# Patient Record
Sex: FEMALE | Race: WHITE | NOT HISPANIC OR LATINO | URBAN - METROPOLITAN AREA
[De-identification: names, ages, dates, MRNs, and addresses within clinical notes are randomized per-mention and may not be internally consistent; named-entity substitution may affect disease eponyms.]

---

## 2018-05-01 ENCOUNTER — OFFICE (OUTPATIENT)
Dept: URBAN - METROPOLITAN AREA CLINIC 12 | Facility: CLINIC | Age: 23
End: 2018-05-01

## 2018-05-01 VITALS
SYSTOLIC BLOOD PRESSURE: 119 MMHG | WEIGHT: 133 LBS | HEIGHT: 67 IN | HEART RATE: 75 BPM | DIASTOLIC BLOOD PRESSURE: 77 MMHG

## 2018-05-01 DIAGNOSIS — K59.00 CONSTIPATION, UNSPECIFIED: ICD-10-CM

## 2018-05-01 DIAGNOSIS — K60.2 ANAL FISSURE, UNSPECIFIED: ICD-10-CM

## 2018-05-01 DIAGNOSIS — K62.89 OTHER SPECIFIED DISEASES OF ANUS AND RECTUM: ICD-10-CM

## 2018-05-01 DIAGNOSIS — K64.9 UNSPECIFIED HEMORRHOIDS: ICD-10-CM

## 2018-05-01 DIAGNOSIS — K62.5 HEMORRHAGE OF ANUS AND RECTUM: ICD-10-CM

## 2018-05-01 PROCEDURE — 99203 OFFICE O/P NEW LOW 30 MIN: CPT | Performed by: INTERNAL MEDICINE

## 2018-05-01 NOTE — SERVICEHPINOTES
Ms. Newton is a 22-year-old female here for evaluation of hemorrhoids and rectal bleeding. The patient states that she has had issues with hemorrhoids and rectal protrusions intermittently since high school. She states that they will flareup every 1-2 months at which time she will take some over-the-counter preparation H cream. More recently she has been having intermittent issues with rectal bleeding. She states that over the past 1-2 years about once a week she will note a small amount of red blood whenever she wipes after having a bowel movement. She did note a few weeks ago that she had several episodes of drips of a small amount of blood after bowel movement into the toilet water to make it red but no large volume hematochezia or blood clots. She denies any black bowel movements. There is no first-degree family history of colon cancer colon polyps. She states normally she will have regular bowel movements about once a month she will have some harder bowel movements with some occasional straining. She states that her father has hemorrhoids. She denies any issues with anemia. She has never had any endoscopic evaluation. There has been no fevers, chills, weight loss, or abdominal pain.

## 2018-05-01 NOTE — SERVICENOTES
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  She does have some small external hemorrhoids and so could very well have some internal hemorrhoids as well.  The differential does include colitis, polyp, and neoplasm but this is less likely.  With that said, given her longstanding intermittent bleeding over the past 1-2 years I do think it is reasonable that she undergo at least flexible sigmoidoscopy to rule out any distal neoplasm.  Given her young age flexible sigmoidoscopy should be adequate, however the patient states that she would discuss full colonoscopy with her parents.  She was told to notify us if she would like to proceed with the flex sig and we can schedule this in clinic.  In the meantime I will recommend that she try nifedipine cream because of her apparent anal fissure and let us know whether not this works.  Also recommended she do a Sitz bath every night.  It is also very important that she works on the intermittent constipation that she has and is sure to drink plenty of water and use stool softeners.

## 2024-01-29 ENCOUNTER — OFFICE (OUTPATIENT)
Dept: URBAN - METROPOLITAN AREA CLINIC 12 | Facility: CLINIC | Age: 29
End: 2024-01-29

## 2024-01-29 VITALS
HEIGHT: 67 IN | SYSTOLIC BLOOD PRESSURE: 119 MMHG | WEIGHT: 133 LBS | DIASTOLIC BLOOD PRESSURE: 75 MMHG | HEART RATE: 70 BPM | OXYGEN SATURATION: 97 %

## 2024-01-29 DIAGNOSIS — K62.5 HEMORRHAGE OF ANUS AND RECTUM: ICD-10-CM

## 2024-01-29 DIAGNOSIS — K64.9 UNSPECIFIED HEMORRHOIDS: ICD-10-CM

## 2024-01-29 DIAGNOSIS — K59.00 CONSTIPATION, UNSPECIFIED: ICD-10-CM

## 2024-01-29 PROCEDURE — 99204 OFFICE O/P NEW MOD 45 MIN: CPT | Performed by: NURSE PRACTITIONER

## 2024-01-29 RX ORDER — HYDROCORTISONE ACETATE 25 MG/1
50 SUPPOSITORY RECTAL
Qty: 14 | Refills: 1 | Status: ACTIVE
Start: 2024-01-29

## 2024-01-29 RX ORDER — SODIUM PICOSULFATE, MAGNESIUM OXIDE, AND ANHYDROUS CITRIC ACID 10; 3.5; 12 MG/160ML; G/160ML; G/160ML
LIQUID ORAL
Qty: 1 | Refills: 0 | Status: COMPLETED
Start: 2024-01-29 | End: 2024-03-02

## 2024-01-29 NOTE — SERVICEHPINOTES
Ms. Newton is a 28 year old   female who is here today in referral for PCP for complaints of constipation, anal fissure, hemorrhoids and blood in her stool.
ifeoma dia  She reports that she has had intermittent blood in her stool for last several years and was previously evaluated by Dr. Lerner with recommendations of sigmoidoscopy.  She was in med school at the time and due to her busy schedule was unable to complete this.  She currently has 2-4 bowel movements a week.  She has had constipation for several years, but feels that it has worsened recently.  She will occasionally take MiraLax to help her have a bowel movement. She is experiencing bloating. She sees blood in her stool approximately once a week.  She reports that it is worse when she is more constipated. She denies rectal pain. She denies symptoms of nausea, vomiting or abdominal pain.  She will occasionally have symptoms of reflux, but reports it is infrequent.  
ifeoma Hayden denies a family history of colon cancer or other GI malignancies.  Her mother has a history of Callaway's.

## 2024-01-31 LAB
TTG IGA/DGP IGG COMBO SCREEN: DEAMIDATED GLIADIN ABS, IGG: 3 UNITS (ref 0–19)
TTG IGA/DGP IGG COMBO SCREEN: T-TRANSGLUTAMINASE (TTG) IGA: <2 U/ML

## 2024-03-02 ENCOUNTER — AMBULATORY SURGICAL CENTER (OUTPATIENT)
Dept: URBAN - METROPOLITAN AREA SURGERY 2 | Facility: SURGERY | Age: 29
End: 2024-03-02

## 2024-03-02 VITALS
DIASTOLIC BLOOD PRESSURE: 74 MMHG | DIASTOLIC BLOOD PRESSURE: 74 MMHG | TEMPERATURE: 98.3 F | OXYGEN SATURATION: 100 % | HEART RATE: 69 BPM | DIASTOLIC BLOOD PRESSURE: 58 MMHG | RESPIRATION RATE: 18 BRPM | SYSTOLIC BLOOD PRESSURE: 100 MMHG | TEMPERATURE: 99.7 F | SYSTOLIC BLOOD PRESSURE: 96 MMHG | RESPIRATION RATE: 16 BRPM | HEART RATE: 66 BPM | DIASTOLIC BLOOD PRESSURE: 58 MMHG | SYSTOLIC BLOOD PRESSURE: 112 MMHG | OXYGEN SATURATION: 97 % | HEART RATE: 66 BPM | TEMPERATURE: 98.3 F | SYSTOLIC BLOOD PRESSURE: 117 MMHG | HEART RATE: 66 BPM | HEART RATE: 68 BPM | DIASTOLIC BLOOD PRESSURE: 62 MMHG | HEART RATE: 63 BPM | RESPIRATION RATE: 17 BRPM | SYSTOLIC BLOOD PRESSURE: 112 MMHG | TEMPERATURE: 98.3 F | OXYGEN SATURATION: 100 % | SYSTOLIC BLOOD PRESSURE: 112 MMHG | OXYGEN SATURATION: 98 % | HEART RATE: 63 BPM | TEMPERATURE: 99.7 F | RESPIRATION RATE: 16 BRPM | HEIGHT: 67 IN | OXYGEN SATURATION: 98 % | HEART RATE: 63 BPM | RESPIRATION RATE: 17 BRPM | SYSTOLIC BLOOD PRESSURE: 117 MMHG | DIASTOLIC BLOOD PRESSURE: 62 MMHG | HEIGHT: 67 IN | RESPIRATION RATE: 16 BRPM | OXYGEN SATURATION: 99 % | OXYGEN SATURATION: 100 % | RESPIRATION RATE: 20 BRPM | HEART RATE: 68 BPM | WEIGHT: 121 LBS | DIASTOLIC BLOOD PRESSURE: 58 MMHG | RESPIRATION RATE: 18 BRPM | HEART RATE: 64 BPM | OXYGEN SATURATION: 97 % | SYSTOLIC BLOOD PRESSURE: 96 MMHG | RESPIRATION RATE: 18 BRPM | SYSTOLIC BLOOD PRESSURE: 117 MMHG | TEMPERATURE: 99.7 F | RESPIRATION RATE: 20 BRPM | DIASTOLIC BLOOD PRESSURE: 62 MMHG | WEIGHT: 121 LBS | SYSTOLIC BLOOD PRESSURE: 100 MMHG | HEART RATE: 64 BPM | HEART RATE: 68 BPM | RESPIRATION RATE: 20 BRPM | HEART RATE: 64 BPM | DIASTOLIC BLOOD PRESSURE: 74 MMHG | SYSTOLIC BLOOD PRESSURE: 96 MMHG | OXYGEN SATURATION: 99 % | SYSTOLIC BLOOD PRESSURE: 100 MMHG | HEART RATE: 69 BPM | OXYGEN SATURATION: 99 % | RESPIRATION RATE: 17 BRPM | HEART RATE: 69 BPM | OXYGEN SATURATION: 97 % | WEIGHT: 121 LBS | HEIGHT: 67 IN | OXYGEN SATURATION: 98 %

## 2024-03-02 DIAGNOSIS — K62.5 HEMORRHAGE OF ANUS AND RECTUM: ICD-10-CM

## 2024-03-02 DIAGNOSIS — K59.00 CONSTIPATION, UNSPECIFIED: ICD-10-CM

## 2024-03-02 PROCEDURE — 45378 DIAGNOSTIC COLONOSCOPY: CPT | Performed by: INTERNAL MEDICINE

## 2024-03-02 RX ADMIN — PROPOFOL 340 MG: 10 INJECTION, EMULSION INTRAVENOUS at 09:45

## 2024-03-02 NOTE — SERVICEHPINOTES
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